# Patient Record
Sex: MALE | URBAN - METROPOLITAN AREA
[De-identification: names, ages, dates, MRNs, and addresses within clinical notes are randomized per-mention and may not be internally consistent; named-entity substitution may affect disease eponyms.]

---

## 2021-10-04 ENCOUNTER — CLAIMS CREATED FROM THE CLAIM WINDOW (OUTPATIENT)
Dept: URBAN - METROPOLITAN AREA CLINIC 84 | Facility: CLINIC | Age: 71
End: 2021-10-04
Payer: MEDICARE

## 2021-10-04 ENCOUNTER — DASHBOARD ENCOUNTERS (OUTPATIENT)
Age: 71
End: 2021-10-04

## 2021-10-04 DIAGNOSIS — R19.5 OCCULT BLOOD IN STOOLS: ICD-10-CM

## 2021-10-04 PROCEDURE — 993 APS NON BILLABLE: Performed by: INTERNAL MEDICINE

## 2021-10-04 RX ORDER — ATORVASTATIN CALCIUM 40 MG/1
TABLET ORAL
Qty: 90 | Status: ACTIVE | COMMUNITY

## 2021-10-04 RX ORDER — NIFEDIPINE 90 MG/1
TAKE 1 TABLET BY MOUTH TWICE DAILY ON AN EMPTY STOMACH TABLET, EXTENDED RELEASE ORAL
Qty: 180 | Refills: 0 | Status: ACTIVE | COMMUNITY

## 2021-10-04 RX ORDER — PANTOPRAZOLE 40 MG/1
TABLET, DELAYED RELEASE ORAL
Qty: 180 | Status: ACTIVE | COMMUNITY

## 2021-10-04 RX ORDER — FLUTICASONE PROPIONATE 50 UG/1
SPRAY, METERED NASAL
Qty: 32 | Status: ACTIVE | COMMUNITY

## 2021-10-04 RX ORDER — CARVEDILOL 25 MG/1
TABLET, FILM COATED ORAL
Qty: 180 | Status: ACTIVE | COMMUNITY

## 2021-10-04 RX ORDER — FERROUS SULFATE TAB 325 MG (65 MG ELEMENTAL FE) 325 (65 FE) MG
TAB ORAL
Qty: 90 | Status: ACTIVE | COMMUNITY

## 2021-10-04 RX ORDER — METFORMIN HYDROCHLORIDE 500 MG/1
TABLET ORAL
Qty: 180 | Status: ACTIVE | COMMUNITY

## 2021-10-04 RX ORDER — LISINOPRIL 20 MG/1
TABLET ORAL
Qty: 90 | Status: ACTIVE | COMMUNITY

## 2021-10-04 NOTE — HPI-TODAY'S VISIT:
69 yo pt presents for evaluation of occult blood positive. He is otherwise asymptomatic. He states that he had a normal colonoscopy 6 yrs ago and denies fmhx of CRC and no h/o colon polyps.